# Patient Record
Sex: MALE | Race: WHITE | NOT HISPANIC OR LATINO
[De-identification: names, ages, dates, MRNs, and addresses within clinical notes are randomized per-mention and may not be internally consistent; named-entity substitution may affect disease eponyms.]

---

## 2021-01-04 PROBLEM — Z00.00 ENCOUNTER FOR PREVENTIVE HEALTH EXAMINATION: Status: ACTIVE | Noted: 2021-01-04

## 2021-01-18 ENCOUNTER — APPOINTMENT (OUTPATIENT)
Dept: CARDIOLOGY | Facility: CLINIC | Age: 56
End: 2021-01-18
Payer: COMMERCIAL

## 2021-01-18 VITALS
DIASTOLIC BLOOD PRESSURE: 82 MMHG | HEART RATE: 66 BPM | HEIGHT: 72 IN | TEMPERATURE: 97.5 F | WEIGHT: 214 LBS | BODY MASS INDEX: 28.99 KG/M2 | SYSTOLIC BLOOD PRESSURE: 120 MMHG | OXYGEN SATURATION: 99 %

## 2021-01-18 DIAGNOSIS — Z82.49 FAMILY HISTORY OF ISCHEMIC HEART DISEASE AND OTHER DISEASES OF THE CIRCULATORY SYSTEM: ICD-10-CM

## 2021-01-18 DIAGNOSIS — Z78.9 OTHER SPECIFIED HEALTH STATUS: ICD-10-CM

## 2021-01-18 PROCEDURE — 99072 ADDL SUPL MATRL&STAF TM PHE: CPT

## 2021-01-18 PROCEDURE — 99244 OFF/OP CNSLTJ NEW/EST MOD 40: CPT

## 2021-01-18 RX ORDER — FENOFIBRATE 145 MG/1
145 TABLET, COATED ORAL
Qty: 90 | Refills: 0 | Status: DISCONTINUED | COMMUNITY
Start: 2020-09-21 | End: 2021-01-18

## 2021-01-18 RX ORDER — AMOXICILLIN 500 MG/1
500 CAPSULE ORAL
Qty: 12 | Refills: 0 | Status: DISCONTINUED | COMMUNITY
Start: 2020-11-11 | End: 2021-01-18

## 2021-01-18 RX ORDER — MULTIVIT-MINS/IRON/FOLIC/LYCOP 8-200-600
TABLET ORAL DAILY
Refills: 0 | Status: ACTIVE | COMMUNITY
Start: 2021-01-18

## 2021-01-18 RX ORDER — ADHESIVE TAPE 3"X 2.3 YD
50 MCG TAPE, NON-MEDICATED TOPICAL DAILY
Refills: 0 | Status: ACTIVE | COMMUNITY
Start: 2021-01-18

## 2021-01-18 NOTE — ASSESSMENT
[FreeTextEntry1] : Reviewed today:\par -EKG October 2020: Sinus rhythm, unremarkable\par -Labs October 2020: .  TG < 110.  LFT and creatinine\par \par

## 2021-01-18 NOTE — REASON FOR VISIT
[FreeTextEntry1] : Devan is a pleasant 55-year-old male history of hypertension, mixed dyslipidemia, generalized anxiety.\par \par He is very active, exercises vigorously on regular basis without any cardiac symptoms\par \par He takes candesartan 16 mg twice a day.  His blood pressure log shows mostly controlled blood pressures but rare / occasional blood pressure readings up to 140/90.  He is asymptomatic for hypertension.\par \par He also takes statins in low-dose and aspirin.  His last fasting LDL was 115.  Triglycerides were controlled without fenofibrate's (at one time he was on fenofibrate which has now been discontinued after he improved his diet significantly)\par \par No past history of cardiovascular event.

## 2021-01-18 NOTE — DISCUSSION/SUMMARY
[FreeTextEntry1] : Hypertension: Continue current medications.  Nonpharmacologic ways of reducing blood pressure were discussed in detail.  Keep blood pressure log.  Blood pressure response to ETT will also be helpful in adjusting his medications.\par \par Hypercholesterolemia.  Patient on low-dose Lipitor.  .  Dietary changes to reduce LDL were discussed.  If the LDL does not improve, the dose may have to be increased in future.\par \par Patient should have CT calcium score for evaluation of early atherosclerosis given his risk factors. \par \par Continue aerobic exercise.  Avoid isometric exercise such as heavy weight lifting which can worsen hypertension\par \par Echocardiogram should be done for evaluation of hypertensive changes\par \par ED for evaluation of exercise performance and blood pressure response to exercise.\par \par Follow-up after above tests.\par \par Thank you for this referral and allowing me to participate in the care of this patient.  If I can be of any further help or  if you have any questions, please do not hesitate to contact me\par \par \par Sincerely,\par \par Frederick Feliciano MD, FACC, [place]

## 2021-01-18 NOTE — PHYSICAL EXAM
[General Appearance - Well Developed] : well developed [Normal Appearance] : normal appearance [Well Groomed] : well groomed [General Appearance - Well Nourished] : well nourished [No Deformities] : no deformities [General Appearance - In No Acute Distress] : no acute distress [Normal Conjunctiva] : the conjunctiva exhibited no abnormalities [Eyelids - No Xanthelasma] : the eyelids demonstrated no xanthelasmas [Normal Oral Mucosa] : normal oral mucosa [No Oral Pallor] : no oral pallor [No Oral Cyanosis] : no oral cyanosis [Respiration, Rhythm And Depth] : normal respiratory rhythm and effort [Exaggerated Use Of Accessory Muscles For Inspiration] : no accessory muscle use [Auscultation Breath Sounds / Voice Sounds] : lungs were clear to auscultation bilaterally [Heart Rate And Rhythm] : heart rate and rhythm were normal [Heart Sounds] : normal S1 and S2 [Murmurs] : no murmurs present [Arterial Pulses Normal] : the arterial pulses were normal [Edema] : no peripheral edema present [Abdomen Soft] : soft [Abdomen Tenderness] : non-tender [Abnormal Walk] : normal gait [Gait - Sufficient For Exercise Testing] : the gait was sufficient for exercise testing [Nail Clubbing] : no clubbing of the fingernails [Cyanosis, Localized] : no localized cyanosis [Skin Color & Pigmentation] : normal skin color and pigmentation [Skin Turgor] : normal skin turgor [] : no rash [Oriented To Time, Place, And Person] : oriented to person, place, and time [Affect] : the affect was normal [Mood] : the mood was normal [No Anxiety] : not feeling anxious [FreeTextEntry1] : No JVD.  No Carotid bruits

## 2021-02-08 ENCOUNTER — APPOINTMENT (OUTPATIENT)
Dept: CARDIOLOGY | Facility: CLINIC | Age: 56
End: 2021-02-08
Payer: COMMERCIAL

## 2021-02-08 PROCEDURE — 93015 CV STRESS TEST SUPVJ I&R: CPT

## 2021-02-08 PROCEDURE — 93306 TTE W/DOPPLER COMPLETE: CPT

## 2021-02-08 PROCEDURE — 99072 ADDL SUPL MATRL&STAF TM PHE: CPT

## 2021-03-16 ENCOUNTER — APPOINTMENT (OUTPATIENT)
Dept: CARDIOLOGY | Facility: CLINIC | Age: 56
End: 2021-03-16
Payer: COMMERCIAL

## 2021-03-16 VITALS
BODY MASS INDEX: 29.66 KG/M2 | OXYGEN SATURATION: 98 % | WEIGHT: 219 LBS | SYSTOLIC BLOOD PRESSURE: 126 MMHG | HEIGHT: 72 IN | DIASTOLIC BLOOD PRESSURE: 80 MMHG | HEART RATE: 86 BPM

## 2021-03-16 PROCEDURE — 99072 ADDL SUPL MATRL&STAF TM PHE: CPT

## 2021-03-16 PROCEDURE — 99214 OFFICE O/P EST MOD 30 MIN: CPT

## 2021-03-16 RX ORDER — CETIRIZINE HCL 10 MG/1
10 CAPSULE ORAL
Refills: 0 | Status: ACTIVE | COMMUNITY

## 2021-03-16 RX ORDER — PSYLLIUM HUSK 0.4 G
CAPSULE ORAL
Refills: 0 | Status: ACTIVE | COMMUNITY

## 2021-03-16 NOTE — DISCUSSION/SUMMARY
[FreeTextEntry1] : Hypertension: Continue current medications.  Nonpharmacologic ways of reducing blood pressure were discussed in detail.  Keep blood pressure log which is showing good blood pressure control as of now.  Blood pressure response to ETT showed systolic blood pressure of 200.  Reduced his weight lifting.  I have asked him to take his blood pressure after weight lifting.  If it is elevated, I will augment his blood pressure regimen.\par \par Hypercholesterolemia.  Patient on low-dose Lipitor.  .  Dietary changes to reduce LDL were discussed.  Since there is some coronary atherosclerosis, increase Lipitor with more aggressive LDL reduction plan for future.  Get LDL close to 70.  Follow-up lipid profile in future.\par \par CT calcium score and CTA of coronaries were discussed in detail.  Implications were discussed.\par \par Continue aerobic exercise.  Avoid isometric exercise such as heavy weight lifting which can worsen hypertension\par \par Echocardiogram was discussed.  No evidence of hypertensive changes on the heart.\par \par Follow-up after 6 months\par \par Thank you for this referral and allowing me to participate in the care of this patient.  If I can be of any further help or  if you have any questions, please do not hesitate to contact me\par \par \par Sincerely,\par \par Frederick Feliciano MD, FAC, [place]

## 2021-03-16 NOTE — ASSESSMENT
[FreeTextEntry1] : Reviewed today:\par -EKG October 2020: Sinus rhythm, unremarkable\par -Labs October 2020: .  TG < 110.  LFT and creatinine\par -March 2021 CT calcium score 36: CTA coronaries showed luminal disease less than 10% in LAD.\par -ETT February 2021: Exercised 9 minutes Ino protocol.  No angina.  ST segment depression inferolaterally were noted.  Peak systolic blood pressure 200.\par -Echocardiogram February 2021: Normal LV function and wall motion.  Normal diastolic function.  Normal PASP.\par

## 2021-03-16 NOTE — PHYSICAL EXAM
[General Appearance - Well Developed] : well developed [Normal Appearance] : normal appearance [Well Groomed] : well groomed [General Appearance - Well Nourished] : well nourished [No Deformities] : no deformities [General Appearance - In No Acute Distress] : no acute distress [Normal Conjunctiva] : the conjunctiva exhibited no abnormalities [Eyelids - No Xanthelasma] : the eyelids demonstrated no xanthelasmas [Normal Oral Mucosa] : normal oral mucosa [No Oral Pallor] : no oral pallor [No Oral Cyanosis] : no oral cyanosis [FreeTextEntry1] : No JVD.  No Carotid bruits [Respiration, Rhythm And Depth] : normal respiratory rhythm and effort [Exaggerated Use Of Accessory Muscles For Inspiration] : no accessory muscle use [Auscultation Breath Sounds / Voice Sounds] : lungs were clear to auscultation bilaterally [Heart Rate And Rhythm] : heart rate and rhythm were normal [Heart Sounds] : normal S1 and S2 [Murmurs] : no murmurs present [Arterial Pulses Normal] : the arterial pulses were normal [Edema] : no peripheral edema present [Abdomen Soft] : soft [Abdomen Tenderness] : non-tender [Abnormal Walk] : normal gait [Gait - Sufficient For Exercise Testing] : the gait was sufficient for exercise testing [Nail Clubbing] : no clubbing of the fingernails [Cyanosis, Localized] : no localized cyanosis [Skin Color & Pigmentation] : normal skin color and pigmentation [Skin Turgor] : normal skin turgor [] : no rash [Oriented To Time, Place, And Person] : oriented to person, place, and time [Affect] : the affect was normal [Mood] : the mood was normal [No Anxiety] : not feeling anxious

## 2021-03-16 NOTE — REASON FOR VISIT
[FreeTextEntry1] : Devan is a pleasant 55-year-old male history of hypertension, mixed dyslipidemia, generalized anxiety.\par \par He is very active, exercises vigorously on regular basis without any cardiac symptoms\par \par He takes candesartan 16 mg twice a day.  His blood pressure log shows mostly controlled blood pressures..  He is asymptomatic for hypertension and it is well controlled today.\par \par He also takes statins in low-dose and aspirin.  His last fasting LDL was 115.  Triglycerides were controlled without fenofibrate's (at one time he was on fenofibrate which has now been discontinued after he improved his diet significantly)\par \par No past history of cardiovascular event.

## 2021-05-28 ENCOUNTER — NON-APPOINTMENT (OUTPATIENT)
Age: 56
End: 2021-05-28

## 2021-09-23 ENCOUNTER — NON-APPOINTMENT (OUTPATIENT)
Age: 56
End: 2021-09-23

## 2021-09-23 ENCOUNTER — APPOINTMENT (OUTPATIENT)
Dept: CARDIOLOGY | Facility: CLINIC | Age: 56
End: 2021-09-23
Payer: COMMERCIAL

## 2021-09-23 VITALS
HEART RATE: 70 BPM | TEMPERATURE: 97.7 F | HEIGHT: 72 IN | BODY MASS INDEX: 28.85 KG/M2 | DIASTOLIC BLOOD PRESSURE: 60 MMHG | OXYGEN SATURATION: 98 % | WEIGHT: 213 LBS | SYSTOLIC BLOOD PRESSURE: 116 MMHG

## 2021-09-23 PROCEDURE — 93242 EXT ECG>48HR<7D RECORDING: CPT

## 2021-09-23 PROCEDURE — 99214 OFFICE O/P EST MOD 30 MIN: CPT

## 2021-09-23 RX ORDER — ALPRAZOLAM 0.5 MG/1
0.5 TABLET ORAL
Refills: 0 | Status: ACTIVE | COMMUNITY

## 2021-09-23 NOTE — DISCUSSION/SUMMARY
[FreeTextEntry1] : - Suspected PSVT.  Patient seen in immediate office visit today for recurrent palpitations sudden rapid pulse and apple watch heart rates up to 150s associated dizziness and diaphoresis.  No history of syncope.  Zio patch 1 week heart monitor started today with 1 week follow-up visit.  If persistent tachycardia patient will call 911 and go to the emergency room.  Recommended increased oral hydration with electrolyte suppliment drinks, avoid caffeine and alcohol intake.  Patient feels he may be having a panic attack and has some relief with Xanax as needed. \par \par Hypertension: Continue current medications.  Nonpharmacologic ways of reducing blood pressure were discussed in detail.  Keep blood pressure log which is showing good blood pressure control as of now.  Blood pressure response to ETT showed systolic blood pressure of 200.  Reduced his weight lifting.  I have asked him to take his blood pressure after weight lifting.  If it is elevated, I will augment his blood pressure regimen.\par \par Hypercholesterolemia.  Patient on low-dose Lipitor.  .  Dietary changes to reduce LDL were discussed.  Since there is some coronary atherosclerosis, increase Lipitor with more aggressive LDL reduction plan for future.  Get LDL close to 70.  Follow-up lipid profile in future.\par \par CT calcium score and CTA of coronaries were discussed in detail.  Implications were discussed.\par \par Continue aerobic exercise.  Avoid isometric exercise such as heavy weight lifting which can worsen hypertension\par \par Thank you for this referral and allowing me to participate in the care of this patient.  If I can be of any further help or  if you have any questions, please do not hesitate to contact me\par \par

## 2021-09-23 NOTE — PHYSICAL EXAM
[General Appearance - Well Developed] : well developed [Normal Appearance] : normal appearance [Well Groomed] : well groomed [General Appearance - Well Nourished] : well nourished [No Deformities] : no deformities [General Appearance - In No Acute Distress] : no acute distress [Eyelids - No Xanthelasma] : the eyelids demonstrated no xanthelasmas [Normal Oral Mucosa] : normal oral mucosa [No Oral Pallor] : no oral pallor [No Oral Cyanosis] : no oral cyanosis [Respiration, Rhythm And Depth] : normal respiratory rhythm and effort [Exaggerated Use Of Accessory Muscles For Inspiration] : no accessory muscle use [Auscultation Breath Sounds / Voice Sounds] : lungs were clear to auscultation bilaterally [Heart Sounds] : normal S1 and S2 [Heart Rate And Rhythm] : heart rate and rhythm were normal [Murmurs] : no murmurs present [Arterial Pulses Normal] : the arterial pulses were normal [Edema] : no peripheral edema present [Abdomen Soft] : soft [Abdomen Tenderness] : non-tender [Abnormal Walk] : normal gait [Gait - Sufficient For Exercise Testing] : the gait was sufficient for exercise testing [Nail Clubbing] : no clubbing of the fingernails [Cyanosis, Localized] : no localized cyanosis [Skin Color & Pigmentation] : normal skin color and pigmentation [Skin Turgor] : normal skin turgor [] : no rash [Oriented To Time, Place, And Person] : oriented to person, place, and time [Affect] : the affect was normal [Mood] : the mood was normal [No Anxiety] : not feeling anxious [Well Developed] : well developed [Well Nourished] : well nourished [No Acute Distress] : no acute distress [Normal Conjunctiva] : normal conjunctiva [Normal Venous Pressure] : normal venous pressure [No Carotid Bruit] : no carotid bruit [Normal S1, S2] : normal S1, S2 [No Murmur] : no murmur [No Rub] : no rub [No Gallop] : no gallop [Clear Lung Fields] : clear lung fields [Good Air Entry] : good air entry [No Respiratory Distress] : no respiratory distress  [Soft] : abdomen soft [Non Tender] : non-tender [No Masses/organomegaly] : no masses/organomegaly [Normal Bowel Sounds] : normal bowel sounds [Normal Gait] : normal gait [No Edema] : no edema [No Cyanosis] : no cyanosis [No Clubbing] : no clubbing [No Varicosities] : no varicosities [No Rash] : no rash [No Skin Lesions] : no skin lesions [Moves all extremities] : moves all extremities [No Focal Deficits] : no focal deficits [Normal Speech] : normal speech [Alert and Oriented] : alert and oriented [Normal memory] : normal memory [FreeTextEntry1] : No JVD.  No Carotid bruits

## 2021-09-23 NOTE — REASON FOR VISIT
[FreeTextEntry1] : Devan is a pleasant 55-year-old male history of hypertension, mixed dyslipidemia, generalized anxiety.\par \par Patient seen in immediate office visit today for recurrent palpitations sudden rapid pulse and apple watch heart rates up to 150s associated dizziness and diaphoresis.  No history of syncope.  Zio patch 1 week heart monitor started today with 1 week follow-up visit.  If persistent tachycardia patient will call 911 and go to the emergency room.  Recommended increased oral hydration with electrolyte suppliment drinks, avoid caffeine and alcohol intake.  Patient feels he may be having a panic attack and has some relief with Xanax as needed. \par \par He is very active, exercises vigorously on regular basis without any cardiac symptoms\par \par He takes candesartan 16 mg twice a day.  His blood pressure log shows mostly controlled blood pressures..  He is asymptomatic for hypertension and it is well controlled today.\par \par He also takes statins in low-dose and aspirin.  His last fasting LDL was 115.  Triglycerides were controlled without fenofibrate's (at one time he was on fenofibrate which has now been discontinued after he improved his diet significantly)\par \par No past history of cardiovascular event.

## 2021-10-06 ENCOUNTER — NON-APPOINTMENT (OUTPATIENT)
Age: 56
End: 2021-10-06

## 2021-10-12 ENCOUNTER — APPOINTMENT (OUTPATIENT)
Dept: CARDIOLOGY | Facility: CLINIC | Age: 56
End: 2021-10-12

## 2021-10-14 ENCOUNTER — APPOINTMENT (OUTPATIENT)
Dept: CARDIOLOGY | Facility: CLINIC | Age: 56
End: 2021-10-14
Payer: COMMERCIAL

## 2021-10-14 VITALS
SYSTOLIC BLOOD PRESSURE: 110 MMHG | DIASTOLIC BLOOD PRESSURE: 78 MMHG | OXYGEN SATURATION: 97 % | HEART RATE: 58 BPM | BODY MASS INDEX: 27.98 KG/M2 | WEIGHT: 218 LBS | HEIGHT: 74 IN | TEMPERATURE: 97.3 F

## 2021-10-14 PROCEDURE — 99214 OFFICE O/P EST MOD 30 MIN: CPT

## 2021-10-14 RX ORDER — METOPROLOL TARTRATE 25 MG/1
25 TABLET, FILM COATED ORAL TWICE DAILY
Qty: 90 | Refills: 3 | Status: DISCONTINUED | COMMUNITY
End: 2021-10-14

## 2021-10-14 RX ORDER — ATORVASTATIN CALCIUM 10 MG/1
10 TABLET, FILM COATED ORAL
Qty: 90 | Refills: 1 | Status: DISCONTINUED | COMMUNITY
Start: 2020-10-14 | End: 2021-10-14

## 2021-10-14 NOTE — DISCUSSION/SUMMARY
[FreeTextEntry1] : Devan is a 56-year-old male with medical history detailed above and active medical issues including:\par \par - Atypical chest pain, cardiac CTA nonobstructive coronaries with coronary calcium score 36 March 2021\par \par - Recurrent palpitations, brief atrial tachycardia.  Improved symptoms on Toprol.  Recommended increased oral hydration with electrolyte suppliment drinks, avoid caffeine and alcohol intake.\par \par - Anxiety panic attack on Xanax recently changed to Zoloft\par \par - Hypertension: Average resting home BPs at guideline goal on candesartan, Lopressor.  Lopressor changed to Toprol 50 mg daily.\par \par - Hypercholesterolemia.  Patient on low-dose Lipitor.  .  Dietary changes to reduce LDL were discussed.  Since there is some coronary atherosclerosis, increase Lipitor with more aggressive LDL reduction plan for future.  Get LDL close to 70.  Follow-up lipid profile in future.\par \par Current cardiac medications remain unchanged and renewals  are up to date. Repeat labs will be ordered with PMD.\par \par Cardiology follow-up 6 months same day echocardiogram\par \par Devan will follow up with Dr. Yoandy Vaughn for primary care. \par \par

## 2021-10-14 NOTE — PHYSICAL EXAM
[Well Developed] : well developed [Well Nourished] : well nourished [No Acute Distress] : no acute distress [Normal Venous Pressure] : normal venous pressure [No Carotid Bruit] : no carotid bruit [Normal S1, S2] : normal S1, S2 [No Murmur] : no murmur [No Rub] : no rub [No Gallop] : no gallop [Clear Lung Fields] : clear lung fields [Good Air Entry] : good air entry [No Respiratory Distress] : no respiratory distress  [Soft] : abdomen soft [Non Tender] : non-tender [No Masses/organomegaly] : no masses/organomegaly [Normal Bowel Sounds] : normal bowel sounds [Normal Gait] : normal gait [No Edema] : no edema [No Cyanosis] : no cyanosis [No Clubbing] : no clubbing [No Varicosities] : no varicosities [No Rash] : no rash [No Skin Lesions] : no skin lesions [Moves all extremities] : moves all extremities [No Focal Deficits] : no focal deficits [Normal Speech] : normal speech [Alert and Oriented] : alert and oriented [Normal memory] : normal memory [General Appearance - Well Developed] : well developed [Normal Appearance] : normal appearance [Well Groomed] : well groomed [General Appearance - Well Nourished] : well nourished [No Deformities] : no deformities [General Appearance - In No Acute Distress] : no acute distress [Normal Conjunctiva] : the conjunctiva exhibited no abnormalities [Eyelids - No Xanthelasma] : the eyelids demonstrated no xanthelasmas [Normal Oral Mucosa] : normal oral mucosa [No Oral Pallor] : no oral pallor [No Oral Cyanosis] : no oral cyanosis [Respiration, Rhythm And Depth] : normal respiratory rhythm and effort [Exaggerated Use Of Accessory Muscles For Inspiration] : no accessory muscle use [Auscultation Breath Sounds / Voice Sounds] : lungs were clear to auscultation bilaterally [Heart Rate And Rhythm] : heart rate and rhythm were normal [Heart Sounds] : normal S1 and S2 [Murmurs] : no murmurs present [Arterial Pulses Normal] : the arterial pulses were normal [Edema] : no peripheral edema present [Abdomen Tenderness] : non-tender [Abdomen Soft] : soft [Abnormal Walk] : normal gait [Gait - Sufficient For Exercise Testing] : the gait was sufficient for exercise testing [Nail Clubbing] : no clubbing of the fingernails [Cyanosis, Localized] : no localized cyanosis [Skin Color & Pigmentation] : normal skin color and pigmentation [] : no rash [Skin Turgor] : normal skin turgor [Oriented To Time, Place, And Person] : oriented to person, place, and time [Affect] : the affect was normal [Mood] : the mood was normal [No Anxiety] : not feeling anxious [FreeTextEntry1] : No JVD.  No Carotid bruits

## 2021-10-14 NOTE — REASON FOR VISIT
[Other: ____] : [unfilled] [FreeTextEntry1] : Devan is a pleasant 56-year-old male history of hypertension, mixed dyslipidemia, generalized anxiety.\par \par No history of CAD, MI, revascularization, VHD, CHF, TIA, CVA, diabetes, PVD, DVT, PE, arrhythmia, AF.\par \par Cardiovascular review of symptoms is negative for exertional chest pain, dyspnea, palpitations, dizziness or syncope.  No PND or orthopnea leg edema.  No bleeding or black stool.\par \par Patient evaluated in the [facility] ER October 4, 2021 anxiety, panic attack, sweating dizziness normal evaluation discharged for outpatient cardiology follow-up.\par \par Patient seen in immediate office visit 9/23/21 for recurrent palpitations sudden rapid pulse and apple watch heart rates up to 150s associated dizziness and diaphoresis.  No history of syncope.  Zio patch 1 week heart monitor started with 1 week follow-up visit.  If persistent tachycardia patient will call 911 and go to the emergency room.  Recommended increased oral hydration with electrolyte suppliment drinks, avoid caffeine and alcohol intake.  Patient feels he may be having a panic attack and has some relief with Xanax as needed. \par \par He is very active, exercises vigorously on regular basis without any cardiac symptoms\par \par Average resting home BPs at guideline goal on candesartan, Lopressor.  Lopressor changed to Toprol 50 mg daily. \par \par He also takes statins in low-dose and aspirin.  His last fasting LDL was 115.  Triglycerides were controlled without fenofibrate's (at one time he was on fenofibrate which has now been discontinued after he improved his diet significantly)\par \par No past history of cardiovascular event.\par \par Cardiac CTA March 2021, coronary calcium score 36, nonobstructive coronaries\par \par Zio patch 1 week heart monitor October 2021 sinus rhythm average heart rate 68 bpm, brief A. tach, rare PACs PVCs\par \par Exercise treadmill stress test February 2021 ischemic EKG response, no chest pain, 88% MPHR, 9 minutes Ino protocol, baseline sinus rhythm PRWP, NSST\par \par Echocardiogram February 2021, LVEF 65%, mild to moderate MR, normal RVSP, aneurysmal atrial septum

## 2021-11-08 PROCEDURE — 93244 EXT ECG>48HR<7D REV&INTERPJ: CPT

## 2022-03-26 ENCOUNTER — RX RENEWAL (OUTPATIENT)
Age: 57
End: 2022-03-26

## 2022-04-01 ENCOUNTER — RX RENEWAL (OUTPATIENT)
Age: 57
End: 2022-04-01

## 2022-04-05 ENCOUNTER — NON-APPOINTMENT (OUTPATIENT)
Age: 57
End: 2022-04-05

## 2022-04-06 ENCOUNTER — APPOINTMENT (OUTPATIENT)
Dept: CARDIOLOGY | Facility: CLINIC | Age: 57
End: 2022-04-06
Payer: COMMERCIAL

## 2022-04-06 VITALS
WEIGHT: 202 LBS | SYSTOLIC BLOOD PRESSURE: 120 MMHG | DIASTOLIC BLOOD PRESSURE: 60 MMHG | BODY MASS INDEX: 25.93 KG/M2 | TEMPERATURE: 97 F | HEIGHT: 74 IN | HEART RATE: 74 BPM | OXYGEN SATURATION: 100 %

## 2022-04-06 PROCEDURE — 99215 OFFICE O/P EST HI 40 MIN: CPT

## 2022-04-06 PROCEDURE — 93306 TTE W/DOPPLER COMPLETE: CPT

## 2022-04-06 RX ORDER — SERTRALINE HYDROCHLORIDE 50 MG/1
50 TABLET, FILM COATED ORAL DAILY
Refills: 0 | Status: DISCONTINUED | COMMUNITY
End: 2022-04-06

## 2022-04-06 RX ORDER — METOPROLOL SUCCINATE 50 MG/1
50 TABLET, EXTENDED RELEASE ORAL DAILY
Qty: 90 | Refills: 1 | Status: DISCONTINUED | COMMUNITY
Start: 2021-10-14 | End: 2022-04-06

## 2022-04-06 NOTE — REASON FOR VISIT
[Other: ____] : [unfilled] [FreeTextEntry1] : Devan is a pleasant 56-year-old male history of hypertension, mixed dyslipidemia, generalized anxiety.\par \par No history of CAD, MI, revascularization, VHD, CHF, TIA, CVA, diabetes, PVD, DVT, PE, arrhythmia, AF.\par \par Cardiovascular review of symptoms is negative for exertional chest pain, dyspnea, palpitations, dizziness or syncope.  No PND or orthopnea leg edema.  No bleeding or black stool.\par \par Patient requesting discontinuation of Toprol.  50 mg will be reduced to 25 mg daily for 2 weeks then 12.5 mg daily for 2 weeks, if heart rate blood pressure well controlled patient will discontinue Toprol\par \par Patient evaluated in the [facility] ER October 4, 2021 anxiety, panic attack, sweating dizziness normal evaluation discharged for outpatient cardiology follow-up.\par \par Patient seen in immediate office visit 9/23/21 for recurrent palpitations sudden rapid pulse and apple watch heart rates up to 150s associated dizziness and diaphoresis.  No history of syncope.  Zio patch 1 week heart monitor started with 1 week follow-up visit.  If persistent tachycardia patient will call 911 and go to the emergency room.  Recommended increased oral hydration with electrolyte suppliment drinks, avoid caffeine and alcohol intake.  Patient feels he may be having a panic attack and has some relief with Xanax as needed. \par \par He is very active, exercises vigorously on regular basis without any cardiac symptoms\par \par Average resting home BPs at guideline goal on candesartan, Lopressor.  Lopressor changed to Toprol 50 mg daily. \par \par He also takes statins in low-dose and aspirin.  His last fasting LDL was 115.  Triglycerides were controlled without fenofibrate's (at one time he was on fenofibrate which has now been discontinued after he improved his diet significantly)\par \par No past history of cardiovascular event.\par \par Echocardiogram April 2022 LVEF 65%, mild to moderate MR, mild TR, normal RVSP, unchanged findings\par \par Cardiac CTA March 2021, coronary calcium score 36, nonobstructive coronaries\par \par Zio patch 1 week heart monitor October 2021 sinus rhythm average heart rate 68 bpm, brief A. tach, rare PACs PVCs\par \par Exercise treadmill stress test February 2021 ischemic EKG response, no chest pain, 88% MPHR, 9 minutes Ino protocol, baseline sinus rhythm PRWP, NSST\par \par Echocardiogram February 2021, LVEF 65%, mild to moderate MR, normal RVSP, aneurysmal atrial septum

## 2022-04-06 NOTE — DISCUSSION/SUMMARY
[FreeTextEntry1] : Devan is a 56-year-old male with medical history detailed above and active medical issues including:\par \par - Atypical chest pain, cardiac CTA nonobstructive coronaries with coronary calcium score 36 March 2021\par \par - Recurrent palpitations, brief atrial tachycardia.  Improved symptoms on Toprol.  Recommended increased oral hydration with electrolyte suppliment drinks, avoid caffeine and alcohol intake. Patient requesting discontinuation of Toprol.  50 mg will be reduced to 25 mg daily for 2 weeks then 12.5 mg daily for 2 weeks, if heart rate blood pressure well controlled patient will discontinue Toprol\par \par - Anxiety panic attack on Xanax recently changed to Zoloft\par \par - Hypertension: Average resting home BPs at guideline goal on candesartan, Lopressor.  Lopressor changed to Toprol 50 mg daily.\par \par - Hypercholesterolemia, continue with Lipitor well-tolerated\par \par Current cardiac medications remain unchanged and renewals  are up to date. Repeat labs will be ordered with PMD.\par \par Cardiology follow-up 6 months . \par \par Devan will follow up with Dr. Yoandy Vaughn for primary care. \par \par

## 2022-04-06 NOTE — PHYSICAL EXAM
[Well Developed] : well developed [Well Nourished] : well nourished [No Acute Distress] : no acute distress [Normal Venous Pressure] : normal venous pressure [No Carotid Bruit] : no carotid bruit [Normal S1, S2] : normal S1, S2 [No Murmur] : no murmur [No Rub] : no rub [No Gallop] : no gallop [Clear Lung Fields] : clear lung fields [Good Air Entry] : good air entry [No Respiratory Distress] : no respiratory distress  [Soft] : abdomen soft [Non Tender] : non-tender [No Masses/organomegaly] : no masses/organomegaly [Normal Bowel Sounds] : normal bowel sounds [Normal Gait] : normal gait [No Edema] : no edema [No Cyanosis] : no cyanosis [No Clubbing] : no clubbing [No Varicosities] : no varicosities [No Rash] : no rash [No Skin Lesions] : no skin lesions [Moves all extremities] : moves all extremities [No Focal Deficits] : no focal deficits [Normal Speech] : normal speech [Alert and Oriented] : alert and oriented [Normal memory] : normal memory [General Appearance - Well Developed] : well developed [Normal Appearance] : normal appearance [Well Groomed] : well groomed [General Appearance - Well Nourished] : well nourished [No Deformities] : no deformities [General Appearance - In No Acute Distress] : no acute distress [Normal Conjunctiva] : the conjunctiva exhibited no abnormalities [Eyelids - No Xanthelasma] : the eyelids demonstrated no xanthelasmas [Normal Oral Mucosa] : normal oral mucosa [No Oral Pallor] : no oral pallor [No Oral Cyanosis] : no oral cyanosis [Respiration, Rhythm And Depth] : normal respiratory rhythm and effort [Exaggerated Use Of Accessory Muscles For Inspiration] : no accessory muscle use [Auscultation Breath Sounds / Voice Sounds] : lungs were clear to auscultation bilaterally [Heart Rate And Rhythm] : heart rate and rhythm were normal [Heart Sounds] : normal S1 and S2 [Murmurs] : no murmurs present [Arterial Pulses Normal] : the arterial pulses were normal [Edema] : no peripheral edema present [Abdomen Soft] : soft [Abdomen Tenderness] : non-tender [Abnormal Walk] : normal gait [Gait - Sufficient For Exercise Testing] : the gait was sufficient for exercise testing [Nail Clubbing] : no clubbing of the fingernails [Cyanosis, Localized] : no localized cyanosis [Skin Color & Pigmentation] : normal skin color and pigmentation [Skin Turgor] : normal skin turgor [] : no rash [Oriented To Time, Place, And Person] : oriented to person, place, and time [Affect] : the affect was normal [Mood] : the mood was normal [No Anxiety] : not feeling anxious [FreeTextEntry1] : No JVD.  No Carotid bruits

## 2022-10-13 ENCOUNTER — RX RENEWAL (OUTPATIENT)
Age: 57
End: 2022-10-13

## 2022-10-17 ENCOUNTER — RESULT CHARGE (OUTPATIENT)
Age: 57
End: 2022-10-17

## 2022-10-18 ENCOUNTER — NON-APPOINTMENT (OUTPATIENT)
Age: 57
End: 2022-10-18

## 2022-10-18 ENCOUNTER — TRANSCRIPTION ENCOUNTER (OUTPATIENT)
Age: 57
End: 2022-10-18

## 2022-10-18 ENCOUNTER — APPOINTMENT (OUTPATIENT)
Dept: CARDIOLOGY | Facility: CLINIC | Age: 57
End: 2022-10-18

## 2022-10-18 VITALS
SYSTOLIC BLOOD PRESSURE: 122 MMHG | BODY MASS INDEX: 28.11 KG/M2 | HEIGHT: 74 IN | WEIGHT: 219 LBS | DIASTOLIC BLOOD PRESSURE: 80 MMHG | TEMPERATURE: 96.9 F | HEART RATE: 66 BPM | OXYGEN SATURATION: 99 %

## 2022-10-18 PROCEDURE — 99215 OFFICE O/P EST HI 40 MIN: CPT

## 2022-10-18 RX ORDER — TADALAFIL 20 MG/1
20 TABLET ORAL
Qty: 30 | Refills: 3 | Status: ACTIVE | COMMUNITY

## 2022-10-18 RX ORDER — ASPIRIN ENTERIC COATED TABLETS 81 MG 81 MG/1
81 TABLET, DELAYED RELEASE ORAL
Qty: 180 | Refills: 1 | Status: DISCONTINUED | COMMUNITY
Start: 2021-01-18 | End: 2022-10-18

## 2022-10-18 RX ORDER — METOPROLOL SUCCINATE 25 MG/1
25 TABLET, EXTENDED RELEASE ORAL DAILY
Qty: 90 | Refills: 1 | Status: COMPLETED | COMMUNITY
Start: 2022-04-06 | End: 2022-10-18

## 2022-10-18 NOTE — DISCUSSION/SUMMARY
[FreeTextEntry1] : Devan is a 57-year-old male with medical history detailed above and active medical issues including:\par \par - Atypical chest pain, cardiac CTA nonobstructive coronaries with coronary calcium score 36 March 2021\par \par - Recurrent palpitations, brief atrial tachycardia.  Improved symptoms on Toprol.  Recommended increased oral hydration with electrolyte suppliment drinks, avoid caffeine and alcohol intake. Patient requesting discontinuation of Toprol.  50 mg will be reduced to 25 mg daily for 2 weeks then 12.5 mg daily for 2 weeks, if heart rate blood pressure well controlled patient will discontinue Toprol\par \par - Anxiety panic attack on Xanax recently changed to Zoloft\par \par - Hypertension: Average resting home BPs at guideline goal on candesartan, Lopressor.  Lopressor changed to Toprol 50 mg daily.\par \par - Hypercholesterolemia, continue with Lipitor well-tolerated\par \par - Erectile dysfunction on Cialis, Toprol discontinued in the past at patient request\par \par Current cardiac medications remain unchanged and renewals  are up to date. Repeat labs will be ordered with PMD.\par \par Cardiology follow-up 6 months . \par \par Devan will follow up with Dr. Yoandy Vaughn for primary care. \par \par

## 2023-04-17 ENCOUNTER — APPOINTMENT (OUTPATIENT)
Dept: CARDIOLOGY | Facility: CLINIC | Age: 58
End: 2023-04-17
Payer: COMMERCIAL

## 2023-04-17 PROCEDURE — 93306 TTE W/DOPPLER COMPLETE: CPT

## 2023-04-19 ENCOUNTER — APPOINTMENT (OUTPATIENT)
Dept: CARDIOLOGY | Facility: CLINIC | Age: 58
End: 2023-04-19
Payer: COMMERCIAL

## 2023-04-19 VITALS
HEART RATE: 75 BPM | OXYGEN SATURATION: 97 % | WEIGHT: 220 LBS | HEIGHT: 74 IN | BODY MASS INDEX: 28.23 KG/M2 | DIASTOLIC BLOOD PRESSURE: 66 MMHG | SYSTOLIC BLOOD PRESSURE: 130 MMHG

## 2023-04-19 PROCEDURE — 99214 OFFICE O/P EST MOD 30 MIN: CPT

## 2023-04-19 NOTE — PHYSICAL EXAM
[Well Developed] : well developed [Well Nourished] : well nourished [No Acute Distress] : no acute distress [Normal Venous Pressure] : normal venous pressure [No Carotid Bruit] : no carotid bruit [Normal S1, S2] : normal S1, S2 [No Murmur] : no murmur [No Rub] : no rub [No Gallop] : no gallop [Clear Lung Fields] : clear lung fields [Good Air Entry] : good air entry [No Respiratory Distress] : no respiratory distress  [Soft] : abdomen soft [Non Tender] : non-tender [No Masses/organomegaly] : no masses/organomegaly [Normal Bowel Sounds] : normal bowel sounds [Normal Gait] : normal gait [No Edema] : no edema [No Cyanosis] : no cyanosis [No Clubbing] : no clubbing [No Varicosities] : no varicosities [No Rash] : no rash [No Skin Lesions] : no skin lesions [Moves all extremities] : moves all extremities [No Focal Deficits] : no focal deficits [Normal Speech] : normal speech [Alert and Oriented] : alert and oriented [Normal memory] : normal memory [Normal Appearance] : normal appearance [General Appearance - Well Developed] : well developed [Well Groomed] : well groomed [General Appearance - Well Nourished] : well nourished [No Deformities] : no deformities [General Appearance - In No Acute Distress] : no acute distress [Normal Conjunctiva] : the conjunctiva exhibited no abnormalities [Eyelids - No Xanthelasma] : the eyelids demonstrated no xanthelasmas [Normal Oral Mucosa] : normal oral mucosa [No Oral Pallor] : no oral pallor [No Oral Cyanosis] : no oral cyanosis [FreeTextEntry1] : No JVD.  No Carotid bruits [Respiration, Rhythm And Depth] : normal respiratory rhythm and effort [Exaggerated Use Of Accessory Muscles For Inspiration] : no accessory muscle use [Auscultation Breath Sounds / Voice Sounds] : lungs were clear to auscultation bilaterally [Heart Rate And Rhythm] : heart rate and rhythm were normal [Heart Sounds] : normal S1 and S2 [Murmurs] : no murmurs present [Arterial Pulses Normal] : the arterial pulses were normal [Edema] : no peripheral edema present [Abdomen Soft] : soft [Abdomen Tenderness] : non-tender [Gait - Sufficient For Exercise Testing] : the gait was sufficient for exercise testing [Abnormal Walk] : normal gait [Nail Clubbing] : no clubbing of the fingernails [Cyanosis, Localized] : no localized cyanosis [Skin Color & Pigmentation] : normal skin color and pigmentation [Skin Turgor] : normal skin turgor [] : no rash [Oriented To Time, Place, And Person] : oriented to person, place, and time [Affect] : the affect was normal [No Anxiety] : not feeling anxious [Mood] : the mood was normal

## 2023-04-19 NOTE — PHYSICAL EXAM
[Well Developed] : well developed [Well Nourished] : well nourished [No Acute Distress] : no acute distress [Normal Venous Pressure] : normal venous pressure [No Carotid Bruit] : no carotid bruit [Normal S1, S2] : normal S1, S2 [No Murmur] : no murmur [No Rub] : no rub [No Gallop] : no gallop [Clear Lung Fields] : clear lung fields [Good Air Entry] : good air entry [No Respiratory Distress] : no respiratory distress  [Soft] : abdomen soft [Non Tender] : non-tender [No Masses/organomegaly] : no masses/organomegaly [Normal Bowel Sounds] : normal bowel sounds [Normal Gait] : normal gait [No Edema] : no edema [No Cyanosis] : no cyanosis [No Clubbing] : no clubbing [No Varicosities] : no varicosities [No Rash] : no rash [No Skin Lesions] : no skin lesions [Moves all extremities] : moves all extremities [No Focal Deficits] : no focal deficits [Normal Speech] : normal speech [Alert and Oriented] : alert and oriented [Normal memory] : normal memory [Normal Appearance] : normal appearance [General Appearance - Well Developed] : well developed [Well Groomed] : well groomed [General Appearance - Well Nourished] : well nourished [No Deformities] : no deformities [General Appearance - In No Acute Distress] : no acute distress [Normal Conjunctiva] : the conjunctiva exhibited no abnormalities [Eyelids - No Xanthelasma] : the eyelids demonstrated no xanthelasmas [Normal Oral Mucosa] : normal oral mucosa [No Oral Pallor] : no oral pallor [No Oral Cyanosis] : no oral cyanosis [FreeTextEntry1] : No JVD.  No Carotid bruits [Respiration, Rhythm And Depth] : normal respiratory rhythm and effort [Exaggerated Use Of Accessory Muscles For Inspiration] : no accessory muscle use [Auscultation Breath Sounds / Voice Sounds] : lungs were clear to auscultation bilaterally [Heart Rate And Rhythm] : heart rate and rhythm were normal [Heart Sounds] : normal S1 and S2 [Murmurs] : no murmurs present [Arterial Pulses Normal] : the arterial pulses were normal [Edema] : no peripheral edema present [Abdomen Soft] : soft [Abdomen Tenderness] : non-tender [Gait - Sufficient For Exercise Testing] : the gait was sufficient for exercise testing [Abnormal Walk] : normal gait [Nail Clubbing] : no clubbing of the fingernails [Cyanosis, Localized] : no localized cyanosis [Skin Color & Pigmentation] : normal skin color and pigmentation [Skin Turgor] : normal skin turgor [] : no rash [Oriented To Time, Place, And Person] : oriented to person, place, and time [Affect] : the affect was normal [Mood] : the mood was normal [No Anxiety] : not feeling anxious

## 2023-04-19 NOTE — REASON FOR VISIT
[Other: ____] : [unfilled] [FreeTextEntry1] : Devan is a pleasant 56-year-old male history of hypertension, mixed dyslipidemia, generalized anxiety.\par \par No history of CAD, MI, revascularization, VHD, CHF, TIA, CVA, diabetes, PVD, DVT, PE, arrhythmia, AF.\par \par F/u 04/19/23: Patient weaned off his beta blocker a few months ago, no further episodes of palpitations. Patient did  note a few episodes of HTN with associated headache and dizziness while taking a testosterone supplement. Patient states that his SBP was up in the 180s with associated symptoms. Patient has since discontinued the supplement with now return of BP mostly in the 120s at home. Patient with no chest pain or dyspnea. \par \par Echocardiogram 04/17/23 with EF 65%, mild to moderate MR, mild TR, borderline pulmonary HTN PASP 34mmHg. \par \par Cardiovascular review of symptoms is negative for exertional chest pain, dyspnea, palpitations,or syncope.  No PND or orthopnea leg edema.  No bleeding or black stool.\par \par Previous Workup:\par Patient evaluated in the [facility] ER October 4, 2021 anxiety, panic attack, sweating dizziness normal evaluation discharged for outpatient cardiology follow-up.\par \par Patient seen in immediate office visit 9/23/21 for recurrent palpitations sudden rapid pulse and apple watch heart rates up to 150s associated dizziness and diaphoresis.  No history of syncope.  Zio patch 1 week heart monitor started with 1 week follow-up visit.  If persistent tachycardia patient will call 911 and go to the emergency room.  Recommended increased oral hydration with electrolyte suppliment drinks, avoid caffeine and alcohol intake.  Patient feels he may be having a panic attack and has some relief with Xanax as needed. \par \par He is very active, exercises vigorously on regular basis without any cardiac symptoms\par \par Average resting home BPs at guideline goal on candesartan, Lopressor.  Lopressor changed to Toprol 50 mg daily. \par \par He also takes statins in low-dose and aspirin.  His last fasting LDL was 115.  Triglycerides were controlled without fenofibrate's (at one time he was on fenofibrate which has now been discontinued after he improved his diet significantly)\par \par No past history of cardiovascular event.\par \par Echocardiogram April 2022 LVEF 65%, mild to moderate MR, mild TR, normal RVSP, unchanged findings\par \par Cardiac CTA March 2021, coronary calcium score 36, nonobstructive coronaries\par \par Zio patch 1 week heart monitor October 2021 sinus rhythm average heart rate 68 bpm, brief A. tach, rare PACs PVCs\par \par Exercise treadmill stress test February 2021 ischemic EKG response, no chest pain, 88% MPHR, 9 minutes Ino protocol, baseline sinus rhythm PRWP, NSST\par \par Echocardiogram February 2021, LVEF 65%, mild to moderate MR, normal RVSP, aneurysmal atrial septum

## 2023-04-19 NOTE — REASON FOR VISIT
[Other: ____] : [unfilled] [FreeTextEntry1] : Devan is a pleasant 56-year-old male history of hypertension, mixed dyslipidemia, generalized anxiety.\par \par No history of CAD, MI, revascularization, VHD, CHF, TIA, CVA, diabetes, PVD, DVT, PE, arrhythmia, AF.\par \par F/u 04/19/23: Patient weaned off his beta blocker a few months ago, no further episodes of palpitations. Patient did  note a few episodes of HTN with associated headache and dizziness while taking a testosterone supplement. Patient states that his SBP was up in the 180s with associated symptoms. Patient has since discontinued the supplement with now return of BP mostly in the 120s at home. Patient with no chest pain or dyspnea. \par \par Echocardiogram 04/17/23 with EF 65%, mild to moderate MR, mild TR, borderline pulmonary HTN PASP 34mmHg. \par \par Cardiovascular review of symptoms is negative for exertional chest pain, dyspnea, palpitations,or syncope.  No PND or orthopnea leg edema.  No bleeding or black stool.\par \par Previous Workup:\par Patient evaluated in the [facility] ER October 4, 2021 anxiety, panic attack, sweating dizziness normal evaluation discharged for outpatient cardiology follow-up.\par \par Patient seen in immediate office visit 9/23/21 for recurrent palpitations sudden rapid pulse and apple watch heart rates up to 150s associated dizziness and diaphoresis.  No history of syncope.  Zio patch 1 week heart monitor started with 1 week follow-up visit.  If persistent tachycardia patient will call 911 and go to the emergency room.  Recommended increased oral hydration with electrolyte suppliment drinks, avoid caffeine and alcohol intake.  Patient feels he may be having a panic attack and has some relief with Xanax as needed. \par \par He is very active, exercises vigorously on regular basis without any cardiac symptoms\par \par Average resting home BPs at guideline goal on candesartan, Lopressor.  Lopressor changed to Toprol 50 mg daily. \par \par He also takes statins in low-dose and aspirin.  His last fasting LDL was 115.  Triglycerides were controlled without fenofibrate's (at one time he was on fenofibrate which has now been discontinued after he improved his diet significantly)\par \par No past history of cardiovascular event.\par \par Echocardiogram April 2022 LVEF 65%, mild to moderate MR, mild TR, normal RVSP, unchanged findings\par \par Cardiac CTA March 2021, coronary calcium score 36, nonobstructive coronaries\par \par Zio patch 1 week heart monitor October 2021 sinus rhythm average heart rate 68 bpm, brief A. tach, rare PACs PVCs\par \par Exercise treadmill stress test February 2021 ischemic EKG response, no chest pain, 88% MPHR, 9 minutes Ino protocol, baseline sinus rhythm PRWP, NSST\par \par Echocardiogram February 2021, LVEF 65%, mild to moderate MR, normal RVSP, aneurysmal atrial septum

## 2023-04-19 NOTE — DISCUSSION/SUMMARY
[FreeTextEntry1] : Devan is a 56-year-old male with medical history detailed above and active medical issues including:\par \par - Atypical chest pain, cardiac CTA nonobstructive coronaries with coronary calcium score 36 March 2021\par \par - Recurrent palpitations, brief atrial tachycardia.  Improved symptoms on Toprol.  Recommended increased oral hydration with electrolyte suppliment drinks, avoid caffeine and alcohol intake. Patient requesting discontinuation of Toprol.  50 mg will be reduced to 25 mg daily for 2 weeks then 12.5 mg daily for 2 weeks, if heart rate blood pressure well controlled patient will discontinue Toprol\par \par - Anxiety panic attack on Xanax recently changed to Zoloft\par \par - Hypertension: Average resting home BPs at guideline goal on candesartan, Lopressor.  Lopressor changed to Toprol 50 mg daily.\par \par - Hypercholesterolemia, continue with Lipitor well-tolerated\par \par Current cardiac medications remain unchanged and renewals  are up to date. Repeat labs will be ordered with PMD.\par \par Cardiology follow-up 6 months . \par \par Devan will follow up with Dr. Yoandy Vaughn for primary care. \par \par  no

## 2023-04-19 NOTE — ASSESSMENT
[FreeTextEntry1] : Hypertension\par - Previously well controlled, but with some elevated readings while taking testosterone supplements. \par - Discussed importance of discontinuing the supplement, and continuing to abstain. \par - BP well controlled today, and likely a degree of white coat HTN as patient's BP <120/80 at home. \par - Continue home Candesartan 16mg PO BID. \par - Reviewed the importance of a low sodium diet and regular cardiovascular exercise.\par - Continue to monitor blood pressure at home. \par - Instructed to call if persistently elevated readings or adverse affects.\par \par Palpitations/Tachycardia \par - Resolved. No further events. \par - Weaned off Toprol XL. Tolerated well. \par \par HLD \par - Patient's last cholesterol 10/22 was not well controlled with LDL of 106, but patient states that he had previously stopped his statin as he was unsure if he needed it. \par - Patient defers increasing Lipitor at this time, will continue at 20mg PO QHS. \par - Repeat lipid profile in 3 months, if still elevated will increase Lipitor to 40mg PO QHS. \par - Low cholesterol diet reviewed.\par - Risk, benefits, and alternatives to statin therapy reviewed. ASCVD 10 year risk  %.\par \par Abnormal EKG\par - CTA cardiac with calcium score of 36, with 10% non-obstructive disease in LAD. \par - Echo 04/17/23 with normal EF, no RWMA, mild to mod MR, mild TR, borderline pulmonary HTN PASP 34 mmHg.\par - Continue aspirin and statin. \par \par Patient will f/u in 6 months. \par Sincerely,\par \par Kim Cruz PA-C\par Patients history, testing, and plan reviewed with supervising MD: Dr. Patrick Valentino\par \par \par \par

## 2023-07-16 ENCOUNTER — RX RENEWAL (OUTPATIENT)
Age: 58
End: 2023-07-16

## 2023-08-21 ENCOUNTER — APPOINTMENT (OUTPATIENT)
Dept: RADIOLOGY | Facility: HOSPITAL | Age: 58
End: 2023-08-21

## 2023-08-21 ENCOUNTER — OUTPATIENT (OUTPATIENT)
Dept: OUTPATIENT SERVICES | Facility: HOSPITAL | Age: 58
LOS: 1 days | End: 2023-08-21
Payer: COMMERCIAL

## 2023-08-21 ENCOUNTER — RESULT REVIEW (OUTPATIENT)
Age: 58
End: 2023-08-21

## 2023-08-21 DIAGNOSIS — R76.11 NONSPECIFIC REACTION TO TUBERCULIN SKIN TEST WITHOUT ACTIVE TUBERCULOSIS: ICD-10-CM

## 2023-08-21 PROCEDURE — 71046 X-RAY EXAM CHEST 2 VIEWS: CPT | Mod: 26

## 2023-10-18 ENCOUNTER — APPOINTMENT (OUTPATIENT)
Dept: CARDIOLOGY | Facility: CLINIC | Age: 58
End: 2023-10-18
Payer: COMMERCIAL

## 2023-10-18 ENCOUNTER — NON-APPOINTMENT (OUTPATIENT)
Age: 58
End: 2023-10-18

## 2023-10-18 VITALS
BODY MASS INDEX: 27.72 KG/M2 | OXYGEN SATURATION: 98 % | SYSTOLIC BLOOD PRESSURE: 128 MMHG | HEIGHT: 74 IN | WEIGHT: 216 LBS | HEART RATE: 69 BPM | DIASTOLIC BLOOD PRESSURE: 82 MMHG

## 2023-10-18 LAB — HBA1C MFR BLD HPLC: 6.6

## 2023-10-18 PROCEDURE — 99214 OFFICE O/P EST MOD 30 MIN: CPT

## 2024-01-31 ENCOUNTER — APPOINTMENT (OUTPATIENT)
Dept: PULMONOLOGY | Facility: CLINIC | Age: 59
End: 2024-01-31
Payer: COMMERCIAL

## 2024-01-31 VITALS
OXYGEN SATURATION: 97 % | HEART RATE: 72 BPM | DIASTOLIC BLOOD PRESSURE: 93 MMHG | SYSTOLIC BLOOD PRESSURE: 159 MMHG | WEIGHT: 216 LBS | TEMPERATURE: 98.1 F | BODY MASS INDEX: 27.72 KG/M2 | HEIGHT: 74 IN | RESPIRATION RATE: 15 BRPM

## 2024-01-31 DIAGNOSIS — R76.12 NONSPECIFIC REACTION TO CELL MEDIATED IMMUNITY MEASUREMENT OF GAMMA INTERFERON ANTIGEN RESPONSE W/OUT ACTIVE TUBERCULOSIS: ICD-10-CM

## 2024-01-31 PROCEDURE — 99204 OFFICE O/P NEW MOD 45 MIN: CPT

## 2024-01-31 NOTE — HISTORY OF PRESENT ILLNESS
[TextBox_4] : 58 year old with hypertension, hypercholesterolemia, with positive quantiferon in September as part of his preemployment physical. He does not recall having had prior quant gold or PPD in the past.  He was born in the [place], denies TB exposures.  Works in IT at [employer].  CXR at [facility] was negative. He was given rifapentin 900mg once weekly for 12 weeks, Isoniazid 900mg once weekly for 12 weeks as well.  Reports not feeling well.  Feeling very sick.  LFTs were noted to rise.  On lab work provided to me by patient -  most recent labs are normal. 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  He is taking a nutritional supplement meant to enhance NO. 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  Denies fever, chills, night sweats weight loss or cough currently

## 2024-01-31 NOTE — ASSESSMENT
[FreeTextEntry1] : 58 year old male, never smoker, with history of hypertension, hypercholesterolemia and anxiety, with positive quantiferon gold detected at employee health screening in August.  CXR was negative. He has no prior knowledge of positive PPD, quant gold or  treatment for LTBI.  NO signs of active TB. He was unable to tolerate Rifapentine/isoniazid regimen- took it for 5 weeks and had mild ALT elevation but did not feel well at all.  Rec: 1- would not treat positive quantiferon at this time.  He was partially treated and intolerant of the regimen.  Regimens for LTBI include INH alone, rifampin or combination regimens which he received.  He had mild LFT abnormalities.  Reports having intolerable symtpoms.  IN this case the risk of toxicity from the treatment outweighs the potential benefits.

## 2024-03-07 ENCOUNTER — APPOINTMENT (OUTPATIENT)
Dept: PULMONOLOGY | Facility: CLINIC | Age: 59
End: 2024-03-07

## 2024-04-01 PROBLEM — I10 HTN (HYPERTENSION): Status: ACTIVE | Noted: 2021-01-18

## 2024-04-01 PROBLEM — F41.1 GENERALIZED ANXIETY DISORDER: Status: ACTIVE | Noted: 2021-01-18

## 2024-04-01 PROBLEM — R94.31 ABNORMAL ECG DURING EXERCISE STRESS TEST: Status: ACTIVE | Noted: 2021-02-08

## 2024-04-01 PROBLEM — R06.09 DOE (DYSPNEA ON EXERTION): Status: ACTIVE | Noted: 2022-10-18

## 2024-04-01 PROBLEM — R00.2 HEART PALPITATIONS: Status: ACTIVE | Noted: 2021-09-23

## 2024-04-01 PROBLEM — R00.0 TACHYCARDIA: Status: ACTIVE | Noted: 2021-09-23

## 2024-04-01 PROBLEM — E78.5 HLD (HYPERLIPIDEMIA): Status: ACTIVE | Noted: 2021-01-18

## 2024-04-08 ENCOUNTER — APPOINTMENT (OUTPATIENT)
Dept: CARDIOLOGY | Facility: CLINIC | Age: 59
End: 2024-04-08
Payer: COMMERCIAL

## 2024-04-08 VITALS
HEIGHT: 74 IN | BODY MASS INDEX: 27.21 KG/M2 | WEIGHT: 212 LBS | HEART RATE: 75 BPM | OXYGEN SATURATION: 97 % | SYSTOLIC BLOOD PRESSURE: 142 MMHG | DIASTOLIC BLOOD PRESSURE: 80 MMHG

## 2024-04-08 DIAGNOSIS — I10 ESSENTIAL (PRIMARY) HYPERTENSION: ICD-10-CM

## 2024-04-08 DIAGNOSIS — R00.0 TACHYCARDIA, UNSPECIFIED: ICD-10-CM

## 2024-04-08 DIAGNOSIS — R00.2 PALPITATIONS: ICD-10-CM

## 2024-04-08 DIAGNOSIS — F41.1 GENERALIZED ANXIETY DISORDER: ICD-10-CM

## 2024-04-08 DIAGNOSIS — R94.31 ABNORMAL ELECTROCARDIOGRAM [ECG] [EKG]: ICD-10-CM

## 2024-04-08 DIAGNOSIS — R06.09 OTHER FORMS OF DYSPNEA: ICD-10-CM

## 2024-04-08 DIAGNOSIS — E78.5 HYPERLIPIDEMIA, UNSPECIFIED: ICD-10-CM

## 2024-04-08 PROCEDURE — G2211 COMPLEX E/M VISIT ADD ON: CPT

## 2024-04-08 PROCEDURE — 99215 OFFICE O/P EST HI 40 MIN: CPT

## 2024-04-08 RX ORDER — CANDESARTAN CILEXETIL 32 MG/1
32 TABLET ORAL DAILY
Qty: 90 | Refills: 1 | Status: ACTIVE | COMMUNITY
Start: 2020-12-04 | End: 1900-01-01

## 2024-04-08 RX ORDER — ATORVASTATIN CALCIUM 20 MG/1
20 TABLET, FILM COATED ORAL
Qty: 90 | Refills: 1 | Status: ACTIVE | COMMUNITY
Start: 2023-07-16 | End: 1900-01-01

## 2024-04-08 NOTE — DISCUSSION/SUMMARY
[FreeTextEntry1] : Devan is a 58-year-old male with medical history detailed above and active medical issues including:  - No further atypical chest pain, cardiac CTA nonobstructive coronaries with coronary calcium score 36 March 2021  - History of palpitations, remains asymptomatic off Toprol.  Recommended increased oral hydration with electrolyte suppliment drinks, avoid caffeine and alcohol.  - Anxiety panic attack on Xanax recently changed to Zoloft  - Hypertension: Average resting home BPs at guideline goal on candesartan off Toprol  - Hypercholesterolemia, continue with Lipitor well-tolerated  - Erectile dysfunction on Cialis, Toprol discontinued in the past at patient request  Current cardiac medications remain unchanged and renewals  are up to date. Repeat labs will be ordered with PMD.  Cardiology follow-up 6 months with echocardiogram and Doppler studies.   Devan will follow up with Dr. Yoandy Vaughn for primary care.   Total time spent 45 minutes, reviewing of test results, chart information, patient discussion, physical exam and completion of chart documentation.

## 2024-04-08 NOTE — REASON FOR VISIT
[Other: ____] : [unfilled] [FreeTextEntry1] : Devan is a pleasant 58-year-old male history of hypertension, mixed dyslipidemia, generalized anxiety.  No history of CAD, MI, revascularization, VHD, CHF, TIA, CVA, diabetes, PVD, DVT, PE, arrhythmia, AF.  Cardiovascular review of symptoms is negative for exertional chest pain, dyspnea, palpitations, dizziness or syncope.  No PND or orthopnea leg edema.  No bleeding or black stool.  Toprol discontinued in the past due to erectile dysfunction, average resting home BPs remain at guideline goal on candesartan  Patient evaluated in the [facility] ER October 4, 2021 anxiety, panic attack, sweating dizziness normal evaluation discharged for outpatient cardiology follow-up.  Patient seen in immediate office visit 9/23/21 for recurrent palpitations sudden rapid pulse and apple watch heart rates up to 150s associated dizziness and diaphoresis.  No history of syncope.  Zio patch 1 week heart monitor started with 1 week follow-up visit.  If persistent tachycardia patient will call 911 and go to the emergency room.  Recommended increased oral hydration with electrolyte suppliment drinks, avoid caffeine and alcohol intake.  Patient feels he may be having a panic attack and has some relief with Xanax as needed.   He is very active, exercises vigorously on regular basis without any cardiac symptoms  Average resting home BPs at guideline goal on candesartan, Lopressor.  Lopressor changed to Toprol 50 mg daily.   He also takes statins in low-dose and aspirin.  His last fasting LDL was 115.  Triglycerides were controlled without fenofibrate's (at one time he was on fenofibrate which has now been discontinued after he improved his diet significantly)  No past history of cardiovascular event.  Echocardiogram April 2022 LVEF 65%, mild to moderate MR, mild TR, normal RVSP, unchanged findings  Cardiac CTA March 2021, coronary calcium score 36, nonobstructive coronaries  Zio patch 1 week heart monitor October 2021 sinus rhythm average heart rate 68 bpm, brief A. tach, rare PACs PVCs  Exercise treadmill stress test February 2021 ischemic EKG response, no chest pain, 88% MPHR, 9 minutes Ino protocol, baseline sinus rhythm PRWP, NSST  Echocardiogram February 2021, LVEF 65%, mild to moderate MR, normal RVSP, aneurysmal atrial septum

## 2024-10-03 ENCOUNTER — RX RENEWAL (OUTPATIENT)
Age: 59
End: 2024-10-03

## 2024-10-28 ENCOUNTER — APPOINTMENT (OUTPATIENT)
Dept: CARDIOLOGY | Facility: CLINIC | Age: 59
End: 2024-10-28
Payer: COMMERCIAL

## 2024-10-28 ENCOUNTER — NON-APPOINTMENT (OUTPATIENT)
Age: 59
End: 2024-10-28

## 2024-10-28 VITALS
SYSTOLIC BLOOD PRESSURE: 112 MMHG | WEIGHT: 215 LBS | DIASTOLIC BLOOD PRESSURE: 80 MMHG | HEART RATE: 61 BPM | HEIGHT: 74 IN | BODY MASS INDEX: 27.59 KG/M2 | OXYGEN SATURATION: 97 %

## 2024-10-28 PROCEDURE — 93978 VASCULAR STUDY: CPT

## 2024-10-28 PROCEDURE — 93306 TTE W/DOPPLER COMPLETE: CPT

## 2024-10-28 PROCEDURE — 93880 EXTRACRANIAL BILAT STUDY: CPT

## 2024-10-28 PROCEDURE — 93000 ELECTROCARDIOGRAM COMPLETE: CPT

## 2024-10-28 PROCEDURE — 93242 EXT ECG>48HR<7D RECORDING: CPT

## 2024-10-28 PROCEDURE — 93000 ELECTROCARDIOGRAM COMPLETE: CPT | Mod: 59

## 2024-10-28 PROCEDURE — 99214 OFFICE O/P EST MOD 30 MIN: CPT

## 2024-10-28 RX ORDER — METOPROLOL SUCCINATE 25 MG/1
25 TABLET, EXTENDED RELEASE ORAL DAILY
Qty: 90 | Refills: 2 | Status: ACTIVE | COMMUNITY
Start: 2024-10-28 | End: 1900-01-01

## 2024-10-28 RX ORDER — LOSARTAN POTASSIUM 25 MG/1
25 TABLET, FILM COATED ORAL
Qty: 90 | Refills: 1 | Status: ACTIVE | COMMUNITY
Start: 2024-10-28 | End: 1900-01-01

## 2024-11-07 ENCOUNTER — APPOINTMENT (OUTPATIENT)
Dept: CARDIOLOGY | Facility: CLINIC | Age: 59
End: 2024-11-07
Payer: COMMERCIAL

## 2024-11-07 PROCEDURE — A9502: CPT | Mod: JZ

## 2024-11-07 PROCEDURE — 78452 HT MUSCLE IMAGE SPECT MULT: CPT

## 2024-11-07 PROCEDURE — 93015 CV STRESS TEST SUPVJ I&R: CPT

## 2024-11-11 ENCOUNTER — APPOINTMENT (OUTPATIENT)
Dept: CARDIOLOGY | Facility: CLINIC | Age: 59
End: 2024-11-11
Payer: COMMERCIAL

## 2024-11-11 DIAGNOSIS — R76.12 NONSPECIFIC REACTION TO CELL MEDIATED IMMUNITY MEASUREMENT OF GAMMA INTERFERON ANTIGEN RESPONSE W/OUT ACTIVE TUBERCULOSIS: ICD-10-CM

## 2024-11-11 DIAGNOSIS — R00.0 TACHYCARDIA, UNSPECIFIED: ICD-10-CM

## 2024-11-11 PROCEDURE — 93244 EXT ECG>48HR<7D REV&INTERPJ: CPT

## 2024-11-18 LAB — HBA1C MFR BLD HPLC: 6.2

## 2024-11-19 ENCOUNTER — APPOINTMENT (OUTPATIENT)
Dept: CARDIOLOGY | Facility: CLINIC | Age: 59
End: 2024-11-19
Payer: COMMERCIAL

## 2024-11-19 VITALS
DIASTOLIC BLOOD PRESSURE: 80 MMHG | HEIGHT: 74 IN | WEIGHT: 205 LBS | SYSTOLIC BLOOD PRESSURE: 110 MMHG | HEART RATE: 80 BPM | OXYGEN SATURATION: 99 % | BODY MASS INDEX: 26.31 KG/M2

## 2024-11-19 DIAGNOSIS — R94.31 ABNORMAL ELECTROCARDIOGRAM [ECG] [EKG]: ICD-10-CM

## 2024-11-19 DIAGNOSIS — I10 ESSENTIAL (PRIMARY) HYPERTENSION: ICD-10-CM

## 2024-11-19 DIAGNOSIS — E78.5 HYPERLIPIDEMIA, UNSPECIFIED: ICD-10-CM

## 2024-11-19 DIAGNOSIS — R00.2 PALPITATIONS: ICD-10-CM

## 2024-11-19 DIAGNOSIS — R06.09 OTHER FORMS OF DYSPNEA: ICD-10-CM

## 2024-11-19 PROCEDURE — 99214 OFFICE O/P EST MOD 30 MIN: CPT

## 2024-11-19 PROCEDURE — 99204 OFFICE O/P NEW MOD 45 MIN: CPT

## 2024-12-03 ENCOUNTER — RX RENEWAL (OUTPATIENT)
Age: 59
End: 2024-12-03

## 2024-12-06 ENCOUNTER — APPOINTMENT (OUTPATIENT)
Dept: CT IMAGING | Facility: CLINIC | Age: 59
End: 2024-12-06
Payer: COMMERCIAL

## 2024-12-06 PROCEDURE — 75574 CT ANGIO HRT W/3D IMAGE: CPT

## 2024-12-12 ENCOUNTER — NON-APPOINTMENT (OUTPATIENT)
Age: 59
End: 2024-12-12

## 2024-12-19 ENCOUNTER — APPOINTMENT (OUTPATIENT)
Dept: CARDIOLOGY | Facility: CLINIC | Age: 59
End: 2024-12-19
Payer: COMMERCIAL

## 2024-12-19 VITALS
HEIGHT: 74 IN | DIASTOLIC BLOOD PRESSURE: 70 MMHG | OXYGEN SATURATION: 97 % | HEART RATE: 78 BPM | SYSTOLIC BLOOD PRESSURE: 102 MMHG | BODY MASS INDEX: 25.67 KG/M2 | WEIGHT: 200 LBS

## 2024-12-19 DIAGNOSIS — E78.5 HYPERLIPIDEMIA, UNSPECIFIED: ICD-10-CM

## 2024-12-19 DIAGNOSIS — R94.31 ABNORMAL ELECTROCARDIOGRAM [ECG] [EKG]: ICD-10-CM

## 2024-12-19 DIAGNOSIS — I10 ESSENTIAL (PRIMARY) HYPERTENSION: ICD-10-CM

## 2024-12-19 PROCEDURE — 99213 OFFICE O/P EST LOW 20 MIN: CPT

## 2024-12-19 RX ORDER — ROSUVASTATIN CALCIUM 20 MG/1
20 TABLET, FILM COATED ORAL
Qty: 90 | Refills: 1 | Status: ACTIVE | COMMUNITY
Start: 2024-12-19 | End: 1900-01-01

## 2024-12-22 LAB — HBA1C MFR BLD HPLC: 6.6

## 2025-04-29 ENCOUNTER — RX RENEWAL (OUTPATIENT)
Age: 60
End: 2025-04-29

## 2025-06-16 ENCOUNTER — RX RENEWAL (OUTPATIENT)
Age: 60
End: 2025-06-16

## 2025-06-26 ENCOUNTER — APPOINTMENT (OUTPATIENT)
Dept: CARDIOLOGY | Facility: CLINIC | Age: 60
End: 2025-06-26
Payer: COMMERCIAL

## 2025-06-26 VITALS
HEIGHT: 74 IN | SYSTOLIC BLOOD PRESSURE: 118 MMHG | BODY MASS INDEX: 26.31 KG/M2 | WEIGHT: 205 LBS | DIASTOLIC BLOOD PRESSURE: 64 MMHG | HEART RATE: 67 BPM | OXYGEN SATURATION: 98 %

## 2025-06-26 PROCEDURE — 99215 OFFICE O/P EST HI 40 MIN: CPT
